# Patient Record
Sex: FEMALE
[De-identification: names, ages, dates, MRNs, and addresses within clinical notes are randomized per-mention and may not be internally consistent; named-entity substitution may affect disease eponyms.]

---

## 2020-09-18 ENCOUNTER — HOSPITAL ENCOUNTER (EMERGENCY)
Dept: HOSPITAL 88 - ER | Age: 30
LOS: 1 days | Discharge: HOME | End: 2020-09-19
Payer: COMMERCIAL

## 2020-09-18 VITALS — BODY MASS INDEX: 25.4 KG/M2 | HEIGHT: 62 IN | WEIGHT: 138 LBS

## 2020-09-18 DIAGNOSIS — K04.7: Primary | ICD-10-CM

## 2020-09-18 DIAGNOSIS — F17.210: ICD-10-CM

## 2020-09-18 PROCEDURE — 99282 EMERGENCY DEPT VISIT SF MDM: CPT

## 2020-09-18 NOTE — EMERGENCY DEPARTMENT NOTE
History of Present Illnes


History of Present Illness


Chief Complaint:  Eye, Ear, Nose, Throat, Dental


History of Present Illness


This is a 30 year old  female  [id number] WAS SENT TO ED BY DENTIST, DR. LAXMI LOPEZ

FOR EVAL; PT


   HAD POST INSERTED TO TOP UPPER RIGHT JAW WEDNESDAY, NOW PRESENTS TO ED WITH


   PURULENT DRAINAGE TO SITE, MINIMAL EDEMA, REDNESS TO RIGHT CHEEK;.


Historian:  Patient


Arrival Mode:  Car


Onset (how long ago):  day(s) (2)


Location:  RIGHT UPPER JAW


Quality:  PAIN, SWELLING, DRAINAGE


Radiation:  Reports non-radiation


Severity:  moderate


Onset quality:  gradual


Duration (how long):  day(s) (2)


Timing of current episode:  constant


Progression:  worsening


Chronicity:  new


Context:  Reports recent surgery (POST PLACED FOR DENTAL IMPLANT ON WEDNESDAY)


Relieving factors:  none


Exacerbating factors:  none


Associated symptoms:  Reports denies other symptoms





Past Medical/Family History


Physician Review


I have reviewed the patient's past medical and family history.  Any updates have

been documented here.





Past Medical History


Recent Fever:  No


Clinical Suspicion of Infectio:  No


New/Unexplained Change in Ment:  No





Social History


Smoking Cessation:  Current every day smoker


Alcohol Use:  Occasional


Any Illegal Drug Use:  No





Family History


Family history of heart diseas:  No





Review of Systems


Review of Systems


Constitutional:  Reports no symptoms


EENTM:  Reports as per HPI


Cardiovascular:  Reports no symptoms


Respiratory:  Reports no symptoms


Gastrointestinal:  Reports no symptoms


Genitourinary:  Reports no symptoms


Musculoskeletal:  Reports no symptoms


Integumentary:  Reports no symptoms


Neurological:  Reports no symptoms


Psychological:  Reports no symptoms


Endocrine:  Reports no symptoms


Hematological/Lymphatic:  Reports no symptoms





Physical Exam


Related Data


Allergies:  


Coded Allergies:  


     No Known Allergies (Unverified , 9/18/20)


Triage Vital Signs





Vital Signs








  Date Time  Temp Pulse Resp B/P (MAP) Pulse Ox O2 Delivery O2 Flow Rate FiO2


 


9/18/20 22:55 98.2 69 16 142/92 100 Room Air  











Physical Exam


CONSTITUTIONAL





Constitutional:  Present well-developed, Present well-nourished, Present 

distressed (MILD)


HENT





PT WITH DENTAL POST RIGHT UPPER WITH MILD SURROUNDING SWELLING AND MINIMAL 

PURULENT DRAINAGE NOTED,


HENT:  Present normocephalic, Present atraumatic, Present oropharynx 

clear/moist, Present nose normal


HENT L/R:  Present left ext ear normal, Present right ext ear normal


EYES





Eyes:  Reports PERRL, Reports conjunctivae normal


NECK


Neck:  Present ROM normal


PULMONARY


Pulmonary:  Present effort normal, Present breath sounds normal


CARDIOVASCULAR





Cardiovascular:  Present regular rhythm, Present heart sounds normal, Present 

capillary refill normal, Present normal rate


GASTROINTESTINAL





Abdominal:  Present soft, Present nontender, Present bowel sounds normal


GENITOURINARY





Genitourinary:  Present exam deferred


SKIN


Skin:  Present warm, Present dry


MUSCULOSKELETAL





Musculoskeletal:  Present ROM normal


NEUROLOGICAL





Neurological:  Present alert, Present oriented x 3, Present no gross motor or 

sensory deficits


PSYCHOLOGICAL


Psychological:  Present mood/affect normal, Present judgement normal





Assessment & Plan


Medical Decision Making


MDM


PT WITH INFECTED DENTAL POST IMPLANT





BICILLIN LA 1.2 MILLION UNITS IM ORDERED


PT INSTRUCTED TO FOLLOW UP WITH DENTIST





Assessment & Plan


Final Impression:  


(1) Dental infection


Depart Disposition:  HOME, SELF-CARE


Last Vital Signs











  Date Time  Temp Pulse Resp B/P (MAP) Pulse Ox O2 Delivery O2 Flow Rate FiO2


 


9/18/20 22:55 98.2 69 16 142/92 100 Room Air  








Medications in the ED





Penicillin G Benzathine 1.2 mu STK-MED ONCE .ROUTE ;  Start 9/18/20 at 23:05;  

Stop 9/18/20 at 22:58;  Status DC


Penicillin G Benzathine 1.2 mu ONCE  STAT IM  Last administered on 9/18/20at 

23:05; Admin Dose 1.2 MU;  Start 9/18/20 at 23:02;  Stop 9/18/20 at 23:07;  

Status DC











XIMENA CAVANAUGH MD        Sep 18, 2020 23:11